# Patient Record
Sex: MALE
[De-identification: names, ages, dates, MRNs, and addresses within clinical notes are randomized per-mention and may not be internally consistent; named-entity substitution may affect disease eponyms.]

---

## 2021-05-20 ENCOUNTER — DASHBOARD ENCOUNTERS (OUTPATIENT)
Age: 61
End: 2021-05-20

## 2021-05-24 ENCOUNTER — OFFICE VISIT (OUTPATIENT)
Dept: URBAN - METROPOLITAN AREA CLINIC 40 | Facility: CLINIC | Age: 61
End: 2021-05-24

## 2021-05-24 PROBLEM — 7200002: Status: ACTIVE | Noted: 2021-05-24

## 2021-05-24 RX ORDER — METOPROLOL TARTRATE 25 MG/1
TABLET, FILM COATED ORAL
Qty: 60 UNSPECIFIED | Status: ACTIVE | COMMUNITY

## 2021-05-24 RX ORDER — MONTELUKAST 10 MG/1
TABLET ORAL
Qty: 30 UNSPECIFIED | Status: ACTIVE | COMMUNITY

## 2021-05-24 RX ORDER — FAMOTIDINE 20 MG/1
TABLET, FILM COATED ORAL
Qty: 30 UNSPECIFIED | Status: ACTIVE | COMMUNITY

## 2021-05-24 RX ORDER — ENALAPRIL MALEATE 5 MG/1
TABLET ORAL
Qty: 30 UNSPECIFIED | Status: ACTIVE | COMMUNITY

## 2021-05-24 RX ORDER — FUROSEMIDE 40 MG/1
TABLET ORAL
Qty: 30 UNSPECIFIED | Status: ACTIVE | COMMUNITY

## 2021-05-24 NOTE — HPI-TODAY'S VISIT:
Pt is referred by Dr. Tim Barth. A copy of this note will be provided for review. Follow up after recent hospitalization. Rock Torres is a 60 y.o. male with history off ETOH abuse, asthma, HTN, HLD admitted on 4/5/2021 for septic shock.after found by family lethargic  with incident of bowel incontinence, seen by renal for K of 5.7 and creatinine of 4.57, now improving to 2.35 with a UO of 1.3 L, Renal US normal and no labs to compare. Treated with loop diuretics with good result Pt not seen at [facility] prior and not seen by GI during hospital event a few weeks ago. Anemia. No GI bleeding or melena. Hgb 7.7, MCV normal, wbc/plt normal. Ferritin 837, B12/Folate normal. Mild shock liver, AST/ALT 400s then normalized by discharge, Bili 3.8, then normalized by discharge. Also, mild RADHA, resolved to Cr 1.4 by discharge. DISCHARGE Date: 4/14/2021 Room: [address] [place] [address]   Attending of Record:  Ashtyn Leyva MD Admitting Physician:  Jono Wagoner MD Discharge Physician: Ashtyn Leyva MD Outpatient PCP: Tim Barth MD   Reason for hospitalization: Septic shock, likely 2/2 UTI   Discharge Diagnosis: Active Problems:   Septic shock due to Klebsiella pneumoniae (HCC)   Atrial flutter, paroxysmal (HCC)   RADHA (acute kidney injury) (HCC)   Type 2 myocardial infarction (HCC)   Elevated brain natriuretic peptide (BNP) level   Bacteremia   Severe sepsis (HCC)   Bilateral lower extremity edema Resolved Problems:   * No resolved hospital problems. *     Procedures Performed:       Hospital Course:   Rock Torres is 60 y.o. male with history of HTN, gout, asthma, alcohol abuse, HLP who was admitted to the ICU on 4/5 for septic shock secondary to UTI. Patient required pressor support but has since been weaned off of pressors to midodrine. No normotensive off of midodrine. Patient with altered mental status secondary to metabolic encephalopathy in the setting of infection and metabolic derangements. CT head negative for acute findings. Since admission patient's mental status has improved. Metabolic derangements are improving with treatment of sepsis and improvement in intravascular hemodynamics. On 4/10 it is felt that the patient is stable for transfer to the floor for further care.  ---- EXAM:   CT ABDOMEN/PELVIS W/O IV CONTRAST   CLINICAL INDICATION:  transaminitis, elevated T bili, RADHA, AMS   TECHNIQUE:  CT scan of the abdomen and pelvis with multiplanar reformatted images generated from the data set without IV contrast. Dose reduction techniques were utilized.    COMPARISON:  No comparison studies are available at this time.   ABDOMEN: Lung bases show mild opacity on image #1 within the left lower lobe which could represent atelectasis or infiltrate. A mild component of atelectasis or infiltrate is suggested within the right lower lobe as well. The liver and the spleen show no  focal abnormalities on noncontrast imaging. There are calcifications within the gallbladder seen dependently. Kidneys demonstrate no stones or obstruction. Small bowel is not abnormally dilated. No free air is seen. No significant retroperitoneal  adenopathy is noted. Old rib fractures on the right are noted.   PELVIS: Evaluation of the colon demonstrates diverticulosis of the left and sigmoid colon without focal inflammation. No significant free fluid is identified. Gutierrez catheter is noted within the bladder.   IMPRESSION: .         .   Subtle lung base opacities may represent early infiltrate and/or atelectasis.   Diverticulosis.   Cholelithiasis   Edited By: Reyna Perez 4/5/2021 7:25 PM   Released By: BENJI MELCHOR MD 4/5/2021 7:53 PM